# Patient Record
Sex: FEMALE | Race: WHITE
[De-identification: names, ages, dates, MRNs, and addresses within clinical notes are randomized per-mention and may not be internally consistent; named-entity substitution may affect disease eponyms.]

---

## 2017-02-09 ENCOUNTER — HOSPITAL ENCOUNTER (OUTPATIENT)
Dept: HOSPITAL 53 - M RAD | Age: 77
Discharge: HOME | End: 2017-02-09
Attending: INTERNAL MEDICINE
Payer: COMMERCIAL

## 2017-02-09 DIAGNOSIS — I15.0: Primary | ICD-10-CM

## 2017-02-09 DIAGNOSIS — N18.3: ICD-10-CM

## 2017-02-09 NOTE — REP
Clinical:  Hypertension and chronic medical renal disease.

 

Technique:  Gray scale and color Doppler evaluation of the kidneys and renal

vasculature using curved array transducer.

 

Findings:

 

The kidneys are essentially normal in contour size and echogenicity and reniform

shape without hydronephrosis, nephrolithiasis, cystic or renal mass lesion.

Right kidney measures 11.0 x 4.4 x 5.1 cm .  Left kidney measures 11.2 x 5.3 x

5.3 cm .  Bladder is incompletely distended and grossly normal by current

evaluation.

 

Color Doppler evaluation of the renal vasculature demonstrates normal arterial

wave patterns, velocities, renal aortic ratios, resistive indices and the

acceleration time.  No sonographic evidence for renal arterial stenosis noted.

Renal vein is patent.

 

Right Kidney:

Peak arterial velocity: 143 cm/sec .

Renal aortic ratio:  1.34 .

Resistive indices:  0.83 - 0.84 .

Acceleration times:  0.030 - 0.056 .

 

Left kidney:

Peak arterial velocity: 146 .

Renal aortic ratio: 1.37 .

Resistive indices:  0.76 - 0.85 .

Acceleration times:  0.034 - 0.044 .

 

Impression:

 

Relatively normal appearance of the bilateral kidneys.

No sonographic evidence to suggest renal arterial stenosis.

 

 

Signed by

Javi Dennison MD 02/09/2017 07:39 A

## 2018-11-20 ENCOUNTER — HOSPITAL ENCOUNTER (OUTPATIENT)
Dept: HOSPITAL 53 - M WHC | Age: 78
End: 2018-11-20
Attending: NURSE PRACTITIONER
Payer: COMMERCIAL

## 2018-11-20 DIAGNOSIS — Z12.31: Primary | ICD-10-CM

## 2018-11-20 PROCEDURE — 77067 SCR MAMMO BI INCL CAD: CPT

## 2019-07-01 ENCOUNTER — HOSPITAL ENCOUNTER (OUTPATIENT)
Dept: HOSPITAL 53 - M RAD | Age: 79
End: 2019-07-01
Attending: INTERNAL MEDICINE
Payer: MEDICARE

## 2019-07-01 DIAGNOSIS — K22.8: Primary | ICD-10-CM

## 2019-07-02 NOTE — REP
Examination Requested: Esophagram Barium Swallow

 

Reason For Exam/Comment: Dysphasia

 

Esophagram:

 

The procedure was performed Claudette Stanton, JAMES, under the direct

supervision of Dr. Dennison. The images were reviewed with Dr. Dennison.

 

A single PA chest x-ray is submitted as a  film.  There is a nodular density

in the lower lung zone at the midclavicular line that appears stable since the

previous chest x-ray dated 12/20/2011.

 

Liquid barium and gas producing granules were given in the erect position as well

as liquid barium in the prone oblique position, in order to perform a double

contrast esophagram examination.

 

Oral and pharyngeal stages of the examination were unremarkable.  Esophageal

transport is less then prompt and efficient with tertiary waves seen throughout

the entire esophagus with to-and-fro mechanism.  Delayed esophageal emptying was

noted. There is no esophagitis, or mucosal ring noted.  There is no hiatal hernia

noted.  Gastroesophageal reflux was not observed throughout the course of the

exam.

 

Impression:

1. Presbyesophagus with delayed emptying.

 

0.9  minutes of fluoroscopy time was utilized for this procedure. Some

fluoroscopic images are performed with last image hold technology.  These images

require no additional radiation.

 

 

Reviewed by

JAMES Casas 07/01/2019 04:02 P

Electronically Signed by

Javi Dennison MD 07/02/2019 04:34 P

## 2019-11-16 ENCOUNTER — HOSPITAL ENCOUNTER (OUTPATIENT)
Dept: HOSPITAL 53 - M ADAMS | Age: 79
End: 2019-11-16
Attending: PHYSICIAN ASSISTANT
Payer: MEDICARE

## 2019-11-16 DIAGNOSIS — M77.31: ICD-10-CM

## 2019-11-16 DIAGNOSIS — M25.571: Primary | ICD-10-CM

## 2019-11-16 DIAGNOSIS — M85.80: ICD-10-CM

## 2019-11-16 NOTE — REP
Right ankle:  Four views.

 

History:  Pain.  No recent injury.  Right ankle pain.

 

Findings:  There is diffuse osteopenia.  Ankle mortise is intact.  Anterolateral

soft tissue swelling and distal calf swelling are seen.  There is Achilles and

plantar calcaneal spurring.  No fracture or subluxation is seen.  No erosive

changes noted.

 

Impression:

 

Heel spurring and diffuse swelling.  Diffuse osteopenia.  No acute bony

abnormality.

 

 

Electronically Signed by

Bud Abraham MD 11/16/2019 04:49 P

## 2020-02-03 ENCOUNTER — HOSPITAL ENCOUNTER (OUTPATIENT)
Dept: HOSPITAL 53 - M WHC | Age: 80
End: 2020-02-03
Attending: NURSE PRACTITIONER
Payer: MEDICARE

## 2020-02-03 DIAGNOSIS — Z12.31: Primary | ICD-10-CM

## 2020-02-03 NOTE — REPMRS
Patient History

The patient states she has not had a clinical breast exam in over

a year. Family history of breast cancer at age 35 in daughter.

 

Digital Woman Screen Mammo: February 3, 2020 - Exam #: 

[id number]

Bilateral CC and MLO view(s) were taken.

 

Technologist: Maggie Nagy, Technologist

Prior study comparison: November 20, 2018, bilateral digital 

woman screen mammo performed at [facility].  October 31, 2017, digital woman screen mammo 

performed at [facility].  

September 30, 2016, digital woman screen mammo performed at 

[facility].

 

FINDINGS: There are scattered fibroglandular densities.  There is

a large dense benign calcification again noted on the left. 

There has been no change in the appearance of the mammogram from 

the prior studies.  There is a mild amount of scattered 

fibroglandular density which is fairly symmetric. There is no 

interval development of dominant mass, architectural distortion, 

or grouped microcalcification suggestive of malignancy.

3-D tomosynthesis shows no additional findings.

 

Assessment: BI-RADS/ACR category 2 mammogram. Benign Findings.

 

Recommendation

Routine screening mammogram of both breasts in 1 year (for women 

over age 40).

This patient's Lifetime Breast Cancer Risk is estimated at 3.0 %.

This mammogram was interpreted with the aid of an FDA-approved 

computer-aided dectection system.

 

Electronically Signed By: Gibson Abraham MD 02/03/20 [id number]

## 2021-02-04 ENCOUNTER — HOSPITAL ENCOUNTER (OUTPATIENT)
Dept: HOSPITAL 53 - M WHC | Age: 81
End: 2021-02-04
Attending: NURSE PRACTITIONER
Payer: MEDICARE

## 2021-02-04 DIAGNOSIS — Z12.31: Primary | ICD-10-CM

## 2021-02-04 NOTE — REPMRS
Patient History

The patient states she has not had a clinical breast exam in over

a year.

Family history of breast cancer at age 35 in daughter.

 

Digital Woman Screen Mammo: February 4, 2021 - Exam #: 

[id number]

Bilateral CC and MLO view(s) were taken.

 

Technologist: Tonia Borrego Technologist

Prior study comparison: February 3, 2020, bilateral digital woman

screen mammo performed at [facility].  November 20, 2018, bilateral digital woman screen 

mammo performed at [facility].  October 31, 2017, digital woman screen mammo performed at

[facility].

 

FINDINGS: There are scattered fibroglandular densities.  The 

Volpara volumetric breast density category is:B. There is a 

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 

again noted.  This measures 19 mm. There has been no change in 

the appearance of the mammogram from the prior studies.  There is

a mild amount of scattered fibroglandular density which is 

fairly symmetric. There is no interval development of dominant 

mass, architectural distortion, or grouped microcalcification 

suggestive of malignancy.

3-D tomosynthesis shows no additional findings.

 

Assessment: BI-RADS/ACR category 2 mammogram. Benign Findings.

 

Recommendation

Routine screening mammogram of both breasts in 1 year (for women 

over age 40).

This patient's  [facility] Lifetime Breast Cancer Risk is 

estimated at 2.8 %.

This mammogram was interpreted with the aid of an FDA-approved 

computer-aided dectection system.

 

Electronically Signed By: Gibson Abraham MD 02/04/21 0934

## 2021-06-01 ENCOUNTER — HOSPITAL ENCOUNTER (OUTPATIENT)
Dept: HOSPITAL 53 - M RAD | Age: 81
End: 2021-06-01
Attending: NURSE PRACTITIONER
Payer: MEDICARE

## 2021-06-01 DIAGNOSIS — R30.0: Primary | ICD-10-CM

## 2021-06-02 NOTE — REP
INDICATION:

DYSPHAGIA.



COMPARISON:

None.



TECHNIQUE:

This procedure was performed under the direct supervision of Dr. Meléndez.  Images were

reviewed with Dr. Meléndez.



Liquid barium and gas producing granules were given in the erect position as well as

liquid barium in the prone oblique positions in order to perform a double contrast

esophagram examination. A combination of fluoroscopy, spot films and last image hold

technology was utilized.





0.5 minutes of fluoro time was utilized for this procedure.





FINDINGS:

A single view PA chest x-ray is submitted as a  film.  There is no change

compared to the previous exam performed on 07/01/2019..



During the oral and pharyngeal stages of deglutition there is laryngeal penetration.

During esophageal transport there is Presbyesophagus with to and fro motion.  This is

unchanged compared to the previous examination performed on 07/01/2019.  There is no

esophagitis or stricture mucosal ring or hiatal hernia.  Gastroesophageal reflux is

not demonstrated on this examination.



IMPRESSION:

Presbyesophagus.  There is no change compared to the previous examination.



<Electronically signed by Umer Delvalle > 06/01/21 [id number]

<Electronically signed by Brad Meléndez > 06/02/21 1311

## 2021-08-09 ENCOUNTER — HOSPITAL ENCOUNTER (OUTPATIENT)
Dept: HOSPITAL 53 - M LAB REF | Age: 81
End: 2021-08-09
Attending: INTERNAL MEDICINE
Payer: MEDICARE

## 2021-08-09 DIAGNOSIS — E83.42: Primary | ICD-10-CM

## 2021-12-09 ENCOUNTER — HOSPITAL ENCOUNTER (OUTPATIENT)
Dept: HOSPITAL 53 - M LAB REF | Age: 81
End: 2021-12-09
Attending: INTERNAL MEDICINE
Payer: MEDICARE

## 2021-12-09 DIAGNOSIS — E83.42: ICD-10-CM

## 2021-12-09 DIAGNOSIS — N18.32: Primary | ICD-10-CM

## 2022-05-11 ENCOUNTER — HOSPITAL ENCOUNTER (OUTPATIENT)
Dept: HOSPITAL 53 - M WUC | Age: 82
End: 2022-05-11
Attending: NURSE PRACTITIONER
Payer: MEDICARE

## 2022-05-11 DIAGNOSIS — M16.11: Primary | ICD-10-CM

## 2022-05-28 ENCOUNTER — HOSPITAL ENCOUNTER (OUTPATIENT)
Dept: HOSPITAL 53 - M LABSMTC | Age: 82
End: 2022-05-28
Attending: INTERNAL MEDICINE
Payer: MEDICARE

## 2022-05-28 DIAGNOSIS — Z20.822: ICD-10-CM

## 2022-05-28 DIAGNOSIS — Z01.818: Primary | ICD-10-CM

## 2022-08-18 ENCOUNTER — HOSPITAL ENCOUNTER (OUTPATIENT)
Dept: HOSPITAL 53 - M WUC | Age: 82
End: 2022-08-18
Attending: PHYSICIAN ASSISTANT
Payer: MEDICARE

## 2022-08-18 DIAGNOSIS — M25.551: Primary | ICD-10-CM

## 2022-08-28 ENCOUNTER — HOSPITAL ENCOUNTER (OUTPATIENT)
Dept: HOSPITAL 53 - M LABSMTC | Age: 82
End: 2022-08-28
Attending: INTERNAL MEDICINE
Payer: MEDICARE

## 2022-08-28 DIAGNOSIS — Z20.822: Primary | ICD-10-CM

## 2022-08-30 ENCOUNTER — HOSPITAL ENCOUNTER (OUTPATIENT)
Dept: HOSPITAL 53 - M WUC | Age: 82
End: 2022-08-30
Attending: NURSE PRACTITIONER
Payer: MEDICARE

## 2022-08-30 DIAGNOSIS — M51.36: Primary | ICD-10-CM

## 2022-09-08 ENCOUNTER — HOSPITAL ENCOUNTER (OUTPATIENT)
Dept: HOSPITAL 53 - M SOG | Age: 82
End: 2022-09-08
Attending: ORTHOPAEDIC SURGERY
Payer: MEDICARE

## 2022-09-08 DIAGNOSIS — M25.551: Primary | ICD-10-CM

## 2022-11-21 ENCOUNTER — HOSPITAL ENCOUNTER (OUTPATIENT)
Dept: HOSPITAL 53 - M LABSMTC | Age: 82
End: 2022-11-21
Attending: ANESTHESIOLOGY
Payer: MEDICARE

## 2022-11-21 DIAGNOSIS — Z11.52: ICD-10-CM

## 2022-11-21 DIAGNOSIS — Z01.818: Primary | ICD-10-CM

## 2022-11-23 ENCOUNTER — HOSPITAL ENCOUNTER (INPATIENT)
Dept: HOSPITAL 53 - M SDC | Age: 82
LOS: 1 days | Discharge: HOME HEALTH | DRG: 517 | End: 2022-11-24
Attending: GENERAL PRACTICE | Admitting: ORTHOPAEDIC SURGERY
Payer: MEDICARE

## 2022-11-23 VITALS — DIASTOLIC BLOOD PRESSURE: 57 MMHG | SYSTOLIC BLOOD PRESSURE: 157 MMHG

## 2022-11-23 VITALS — SYSTOLIC BLOOD PRESSURE: 172 MMHG | DIASTOLIC BLOOD PRESSURE: 68 MMHG

## 2022-11-23 VITALS — SYSTOLIC BLOOD PRESSURE: 148 MMHG | DIASTOLIC BLOOD PRESSURE: 61 MMHG

## 2022-11-23 VITALS — DIASTOLIC BLOOD PRESSURE: 61 MMHG | SYSTOLIC BLOOD PRESSURE: 151 MMHG

## 2022-11-23 VITALS — WEIGHT: 179.68 LBS | HEIGHT: 65 IN | BODY MASS INDEX: 29.94 KG/M2

## 2022-11-23 VITALS — SYSTOLIC BLOOD PRESSURE: 149 MMHG | DIASTOLIC BLOOD PRESSURE: 65 MMHG

## 2022-11-23 DIAGNOSIS — Z79.82: ICD-10-CM

## 2022-11-23 DIAGNOSIS — E11.42: ICD-10-CM

## 2022-11-23 DIAGNOSIS — E66.9: ICD-10-CM

## 2022-11-23 DIAGNOSIS — I10: ICD-10-CM

## 2022-11-23 DIAGNOSIS — E11.51: ICD-10-CM

## 2022-11-23 DIAGNOSIS — K21.00: ICD-10-CM

## 2022-11-23 DIAGNOSIS — Z79.890: ICD-10-CM

## 2022-11-23 DIAGNOSIS — M48.062: Primary | ICD-10-CM

## 2022-11-23 DIAGNOSIS — E11.22: ICD-10-CM

## 2022-11-23 DIAGNOSIS — N18.9: ICD-10-CM

## 2022-11-23 DIAGNOSIS — K29.50: ICD-10-CM

## 2022-11-23 DIAGNOSIS — M19.90: ICD-10-CM

## 2022-11-23 DIAGNOSIS — Z79.4: ICD-10-CM

## 2022-11-23 DIAGNOSIS — S00.11XA: ICD-10-CM

## 2022-11-23 DIAGNOSIS — I65.23: ICD-10-CM

## 2022-11-23 DIAGNOSIS — Z79.899: ICD-10-CM
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
## 2022-11-23 DIAGNOSIS — E03.9: ICD-10-CM
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
## 2022-11-23 DIAGNOSIS — K22.0: ICD-10-CM

## 2022-11-23 DIAGNOSIS — E78.2: ICD-10-CM
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
## 2022-11-23 LAB
ALBUMIN SERPL BCG-MCNC: 3.6 G/DL (ref 3.2–5.2)
ALT SERPL W P-5'-P-CCNC: 25 U/L (ref 7–40)
BILIRUB SERPL-MCNC: 0.3 MG/DL (ref 0.3–1.2)
BUN SERPL-MCNC: 30 MG/DL (ref 9–23)
CALCIUM SERPL-MCNC: 8.9 MG/DL (ref 8.3–10.6)
CHLORIDE SERPL-SCNC: 99 MMOL/L (ref 98–107)
CO2 SERPL-SCNC: 25 MMOL/L (ref 20–31)
CREAT SERPL-MCNC: 1 MG/DL (ref 0.55–1.3)
EST. AVERAGE GLUCOSE BLD GHB EST-MCNC: 143 MG/DL (ref 60–110)
GFR SERPL CREATININE-BSD FRML MDRD: 56.8 ML/MIN/{1.73_M2} (ref 32–?)
GLUCOSE SERPL-MCNC: 123 MG/DL (ref 74–106)
HCT VFR BLD AUTO: 33.8 % (ref 36–47)
HGB BLD-MCNC: 11.3 G/DL (ref 12–15.5)
MCH RBC QN AUTO: 30.3 PG (ref 27–33)
MCHC RBC AUTO-ENTMCNC: 33.4 G/DL (ref 32–36.5)
MCV RBC AUTO: 90.6 FL (ref 80–96)
PLATELET # BLD AUTO: 167 10^3/UL (ref 150–450)
POTASSIUM SERPL-SCNC: 4.9 MMOL/L (ref 3.5–5.1)
PROT SERPL-MCNC: 6.2 G/DL (ref 5.7–8.2)
RBC # BLD AUTO: 3.73 10^6/UL (ref 4–5.4)
SODIUM SERPL-SCNC: 135 MMOL/L (ref 136–145)
WBC # BLD AUTO: 10 10^3/UL (ref 4–10)

## 2022-11-23 PROCEDURE — 01NB0ZZ RELEASE LUMBAR NERVE, OPEN APPROACH: ICD-10-PCS | Performed by: ORTHOPAEDIC SURGERY

## 2022-11-23 RX ADMIN — INSULIN LISPRO SCH UNITS: 100 INJECTION, SOLUTION INTRAVENOUS; SUBCUTANEOUS at 17:45

## 2022-11-23 RX ADMIN — GABAPENTIN SCH MG: 300 CAPSULE ORAL at 21:27

## 2022-11-23 RX ADMIN — CEFAZOLIN SODIUM SCH MLS/HR: 2 SOLUTION INTRAVENOUS at 15:34

## 2022-11-24 VITALS — SYSTOLIC BLOOD PRESSURE: 160 MMHG | DIASTOLIC BLOOD PRESSURE: 59 MMHG

## 2022-11-24 VITALS — SYSTOLIC BLOOD PRESSURE: 162 MMHG | DIASTOLIC BLOOD PRESSURE: 59 MMHG

## 2022-11-24 VITALS — DIASTOLIC BLOOD PRESSURE: 59 MMHG | SYSTOLIC BLOOD PRESSURE: 162 MMHG

## 2022-11-24 LAB
ALBUMIN SERPL BCG-MCNC: 3.6 G/DL (ref 3.2–5.2)
ALT SERPL W P-5'-P-CCNC: 19 U/L (ref 7–40)
BILIRUB SERPL-MCNC: 0.3 MG/DL (ref 0.3–1.2)
BUN SERPL-MCNC: 26 MG/DL (ref 9–23)
CALCIUM SERPL-MCNC: 8.8 MG/DL (ref 8.3–10.6)
CHLORIDE SERPL-SCNC: 100 MMOL/L (ref 98–107)
CO2 SERPL-SCNC: 28 MMOL/L (ref 20–31)
CREAT SERPL-MCNC: 1.04 MG/DL (ref 0.55–1.3)
GFR SERPL CREATININE-BSD FRML MDRD: 54.3 ML/MIN/{1.73_M2} (ref 32–?)
GLUCOSE SERPL-MCNC: 87 MG/DL (ref 74–106)
HCT VFR BLD AUTO: 32.6 % (ref 36–47)
HGB BLD-MCNC: 10.7 G/DL (ref 12–15.5)
INR PPP: 1
MCH RBC QN AUTO: 30.4 PG (ref 27–33)
MCHC RBC AUTO-ENTMCNC: 32.8 G/DL (ref 32–36.5)
MCV RBC AUTO: 92.6 FL (ref 80–96)
PHOSPHATE SERPL-MCNC: 4.4 MG/DL (ref 2.4–5.1)
PLATELET # BLD AUTO: 172 10^3/UL (ref 150–450)
POTASSIUM SERPL-SCNC: 4.6 MMOL/L (ref 3.5–5.1)
PROT SERPL-MCNC: 6 G/DL (ref 5.7–8.2)
PROTHROMBIN TIME: 13.4 SECONDS (ref 12.5–14.5)
RBC # BLD AUTO: 3.52 10^6/UL (ref 4–5.4)
SODIUM SERPL-SCNC: 137 MMOL/L (ref 136–145)
WBC # BLD AUTO: 9.4 10^3/UL (ref 4–10)

## 2022-11-24 RX ADMIN — INSULIN LISPRO SCH UNITS: 100 INJECTION, SOLUTION INTRAVENOUS; SUBCUTANEOUS at 12:36

## 2022-11-24 RX ADMIN — GABAPENTIN SCH MG: 300 CAPSULE ORAL at 07:37

## 2022-11-24 RX ADMIN — INSULIN LISPRO SCH UNITS: 100 INJECTION, SOLUTION INTRAVENOUS; SUBCUTANEOUS at 07:30

## 2022-11-24 RX ADMIN — INSULIN LISPRO SCH UNITS: 100 INJECTION, SOLUTION INTRAVENOUS; SUBCUTANEOUS at 12:38

## 2022-11-24 RX ADMIN — CEFAZOLIN SODIUM SCH MLS/HR: 2 SOLUTION INTRAVENOUS at 00:47

## 2023-01-12 ENCOUNTER — HOSPITAL ENCOUNTER (OUTPATIENT)
Dept: HOSPITAL 53 - M WHC | Age: 83
End: 2023-01-12
Attending: NURSE PRACTITIONER
Payer: MEDICARE

## 2023-01-12 DIAGNOSIS — Z12.31: Primary | ICD-10-CM

## 2025-03-21 ENCOUNTER — HOSPITAL ENCOUNTER (OUTPATIENT)
Dept: HOSPITAL 53 - M WHC | Age: 85
End: 2025-03-21
Attending: PHYSICIAN ASSISTANT
Payer: COMMERCIAL

## 2025-03-21 DIAGNOSIS — I65.23: Primary | ICD-10-CM
